# Patient Record
Sex: MALE | Race: WHITE | NOT HISPANIC OR LATINO | URBAN - METROPOLITAN AREA
[De-identification: names, ages, dates, MRNs, and addresses within clinical notes are randomized per-mention and may not be internally consistent; named-entity substitution may affect disease eponyms.]

---

## 2020-03-14 ENCOUNTER — NURSE TRIAGE (OUTPATIENT)
Dept: OTHER | Facility: OTHER | Age: 40
End: 2020-03-14

## 2020-03-15 NOTE — TELEPHONE ENCOUNTER
Reason for Disposition   Message left on unidentified voice mail  Phone number verified      Protocols used: NO CONTACT OR DUPLICATE CONTACT CALL-ADULT-

## 2020-03-15 NOTE — TELEPHONE ENCOUNTER
Regarding: Efrain Valenzuela  ----- Message from Perez Hankins sent at 3/14/2020  5:40 PM EDT -----  "I feel like I have all the COROVID-19 symptoms & I recently had the flu & it's completely different than that"